# Patient Record
Sex: FEMALE | Race: WHITE
[De-identification: names, ages, dates, MRNs, and addresses within clinical notes are randomized per-mention and may not be internally consistent; named-entity substitution may affect disease eponyms.]

---

## 2017-06-21 ENCOUNTER — HOSPITAL ENCOUNTER (EMERGENCY)
Dept: HOSPITAL 17 - PHED | Age: 27
Discharge: HOME | End: 2017-06-21
Payer: SELF-PAY

## 2017-06-21 VITALS
TEMPERATURE: 98.1 F | RESPIRATION RATE: 16 BRPM | HEART RATE: 92 BPM | SYSTOLIC BLOOD PRESSURE: 159 MMHG | OXYGEN SATURATION: 100 % | DIASTOLIC BLOOD PRESSURE: 109 MMHG

## 2017-06-21 VITALS
DIASTOLIC BLOOD PRESSURE: 67 MMHG | HEART RATE: 92 BPM | SYSTOLIC BLOOD PRESSURE: 113 MMHG | OXYGEN SATURATION: 96 % | RESPIRATION RATE: 18 BRPM

## 2017-06-21 VITALS
SYSTOLIC BLOOD PRESSURE: 140 MMHG | OXYGEN SATURATION: 100 % | DIASTOLIC BLOOD PRESSURE: 78 MMHG | RESPIRATION RATE: 18 BRPM | TEMPERATURE: 98.1 F | HEART RATE: 88 BPM

## 2017-06-21 VITALS — SYSTOLIC BLOOD PRESSURE: 117 MMHG | DIASTOLIC BLOOD PRESSURE: 76 MMHG

## 2017-06-21 VITALS — BODY MASS INDEX: 41.37 KG/M2 | HEIGHT: 63 IN | WEIGHT: 233.47 LBS

## 2017-06-21 VITALS
DIASTOLIC BLOOD PRESSURE: 72 MMHG | HEART RATE: 86 BPM | SYSTOLIC BLOOD PRESSURE: 124 MMHG | RESPIRATION RATE: 18 BRPM | OXYGEN SATURATION: 98 %

## 2017-06-21 DIAGNOSIS — R11.10: ICD-10-CM

## 2017-06-21 DIAGNOSIS — R10.13: Primary | ICD-10-CM

## 2017-06-21 LAB
ALP SERPL-CCNC: 85 U/L (ref 45–117)
ALT SERPL-CCNC: 26 U/L (ref 10–53)
ANION GAP SERPL CALC-SCNC: 8 MEQ/L (ref 5–15)
AST SERPL-CCNC: 15 U/L (ref 15–37)
BACTERIA #/AREA URNS HPF: (no result) /HPF
BASOPHILS # BLD AUTO: 0 TH/MM3 (ref 0–0.2)
BASOPHILS NFR BLD: 0.3 % (ref 0–2)
BILIRUB SERPL-MCNC: 0.3 MG/DL (ref 0.2–1)
BUN SERPL-MCNC: 13 MG/DL (ref 7–18)
CHLORIDE SERPL-SCNC: 108 MEQ/L (ref 98–107)
COLOR UR: YELLOW
COMMENT (UR): (no result)
CULTURE IF INDICATED: (no result)
EOSINOPHIL # BLD: 0.3 TH/MM3 (ref 0–0.4)
EOSINOPHIL NFR BLD: 2.5 % (ref 0–4)
ERYTHROCYTE [DISTWIDTH] IN BLOOD BY AUTOMATED COUNT: 13 % (ref 11.6–17.2)
GFR SERPLBLD BASED ON 1.73 SQ M-ARVRAT: 100 ML/MIN (ref 89–?)
GLUCOSE UR STRIP-MCNC: (no result) MG/DL
HCO3 BLD-SCNC: 26.2 MEQ/L (ref 21–32)
HCT VFR BLD CALC: 38 % (ref 35–46)
HEMO FLAGS: (no result)
HGB UR QL STRIP: (no result)
KETONES UR STRIP-MCNC: (no result) MG/DL
LEUKOCYTE ESTERASE UR QL STRIP: (no result) /HPF (ref 0–5)
LYMPHOCYTES # BLD AUTO: 1.5 TH/MM3 (ref 1–4.8)
LYMPHOCYTES NFR BLD AUTO: 12.8 % (ref 9–44)
MCH RBC QN AUTO: 28.4 PG (ref 27–34)
MCHC RBC AUTO-ENTMCNC: 34.5 % (ref 32–36)
MCV RBC AUTO: 82.2 FL (ref 80–100)
MONOCYTES NFR BLD: 3.4 % (ref 0–8)
NEUTROPHILS # BLD AUTO: 9.6 TH/MM3 (ref 1.8–7.7)
NEUTROPHILS NFR BLD AUTO: 81 % (ref 16–70)
NITRITE UR QL STRIP: (no result)
PLATELET # BLD: 295 TH/MM3 (ref 150–450)
POTASSIUM SERPL-SCNC: 4 MEQ/L (ref 3.5–5.1)
RBC # BLD AUTO: 4.63 MIL/MM3 (ref 4–5.3)
RBC #/AREA URNS HPF: (no result) /HPF (ref 0–3)
SCAN/DIFF: (no result)
SODIUM SERPL-SCNC: 142 MEQ/L (ref 136–145)
SP GR UR STRIP: 1.02 (ref 1–1.03)
SQUAMOUS #/AREA URNS HPF: (no result) /HPF (ref 0–5)
WBC # BLD AUTO: 11.8 TH/MM3 (ref 4–11)

## 2017-06-21 PROCEDURE — 96361 HYDRATE IV INFUSION ADD-ON: CPT

## 2017-06-21 PROCEDURE — 71010: CPT

## 2017-06-21 PROCEDURE — 83690 ASSAY OF LIPASE: CPT

## 2017-06-21 PROCEDURE — 80053 COMPREHEN METABOLIC PANEL: CPT

## 2017-06-21 PROCEDURE — 96374 THER/PROPH/DIAG INJ IV PUSH: CPT

## 2017-06-21 PROCEDURE — 96375 TX/PRO/DX INJ NEW DRUG ADDON: CPT

## 2017-06-21 PROCEDURE — 74176 CT ABD & PELVIS W/O CONTRAST: CPT

## 2017-06-21 PROCEDURE — 93005 ELECTROCARDIOGRAM TRACING: CPT

## 2017-06-21 PROCEDURE — 99285 EMERGENCY DEPT VISIT HI MDM: CPT

## 2017-06-21 PROCEDURE — 85025 COMPLETE CBC W/AUTO DIFF WBC: CPT

## 2017-06-21 PROCEDURE — 84703 CHORIONIC GONADOTROPIN ASSAY: CPT

## 2017-06-21 PROCEDURE — 81001 URINALYSIS AUTO W/SCOPE: CPT

## 2017-06-21 NOTE — RADRPT
EXAM DATE/TIME:  06/21/2017 18:51 

 

[facility] COMPARISON:     

No previous studies available for comparison.

 

                     

INDICATIONS :     

Chest and abdominal pain.

                     

 

MEDICAL HISTORY :     

None.          

 

SURGICAL HISTORY :     

None.   

 

ENCOUNTER:     

Initial                                        

 

ACUITY:     

1 day      

 

PAIN SCORE:     

6/10

 

LOCATION:     

Bilateral lower chest 

 

FINDINGS:     

Portable AP view of the chest demonstrates a normal-sized cardiac silhouette. No effusion, consolidat
ion, or pneumothorax is visualized. The bones and soft tissues demonstrate no acute abnormality.

 

CONCLUSION:     

No acute cardiopulmonary abnormality is identified.

 

 

 

 Donaldo Ngo MD on June 21, 2017 at 19:14           

Board Certified Radiologist.

 This report was verified electronically.

## 2017-06-21 NOTE — RADRPT
EXAM DATE/TIME:  06/21/2017 19:46 

 

[facility] COMPARISON:     

No previous studies available for comparison.

 

 

INDICATIONS :     

Left flank pain.

                  

 

ORAL CONTRAST:      

No oral contrast ingested.

                  

 

RADIATION DOSE:     

27.88 CTDIvol (mGy) 

 

 

MEDICAL HISTORY :     

None  

 

SURGICAL HISTORY :      

None. 

 

ENCOUNTER:      

Initial

 

ACUITY:      

1 day

 

PAIN SCALE:      

8/10

 

LOCATION:        

flank 

 

TECHNIQUE:     

Volumetric scanning of the abdomen and pelvis was performed.  Using automated exposure control and ad
justment of the mA and/or kV according to patient size, radiation dose was kept as low as reasonably 
achievable to obtain optimal diagnostic quality images. 

 

FINDINGS:     

 

LOWER LUNGS:     

The visualized lower lungs are clear.

 

LIVER:     

Liver is isodense to the spleen. No focal lesion is seen.  There is no dilation of the biliary tree. 
 No calcified gallstones.

 

SPLEEN:     

Normal size without lesion.

 

PANCREAS:     

Within normal limits. 

 

KIDNEYS:     

Left kidney is slightly smaller than right with areas of parenchymal scar. There is no hydronephrosis
, stone, or mass.

 

ADRENAL GLANDS:     

Within normal limits.

 

VASCULAR:     

There is no aortic aneurysm.

 

BOWEL/MESENTERY:     

The stomach, small bowel, and colon demonstrate no acute abnormality.  There is no free intraperitone
al air. There is trace free fluid in the pelvis in the posterior cul-de-sac. 

 

ABDOMINAL WALL:     

Within normal limits.

 

RETROPERITONEUM:     

There is no lymphadenopathy.

 

BLADDER:     

No wall thickening or mass.

 

REPRODUCTIVE:     

Within normal limits.

 

INGUINAL:     

There is no lymphadenopathy or hernia.

 

MUSCULOSKELETAL:     

No acute abnormality.

 

CONCLUSION:     

1. No abnormality is identified to explain the patient's flank pain. No renal stones are present.

2. Trace free fluid in the pelvis is nonspecific but may be physiologic.

3. Mild hepatic steatosis.

 

 

 

 Donaldo Ngo MD on June 21, 2017 at 20:04           

Board Certified Radiologist.

 This report was verified electronically.

## 2017-06-21 NOTE — PD
Physical Exam


Date Seen by Provider:  Jun 21, 2017


Time Seen by Provider:  19:30


Narrative


accepted in transfer of care from Dr Barrientos


GENERAL: Well-developed well-nourished female in no acute distress no 

respiratory distress


SKIN: Warm and dry.


HEAD: Normocephalic.


EYES: No scleral icterus. No injection or drainage. 


NECK: Supple, trachea midline. No JVD or lymphadenopathy.


CARDIOVASCULAR: Regular rate and rhythm without murmurs, gallops, or rubs. 


RESPIRATORY: Breath sounds equal bilaterally. No accessory muscle use.


GASTROINTESTINAL: Abdomen soft, non-tender, nondistended. 


MUSCULOSKELETAL: No cyanosis, or edema. 


BACK: Nontender without obvious deformity. No CVA tenderness.








Data


Data


Last Documented VS





Vital Signs








  Date Time  Temp Pulse Resp B/P Pulse Ox O2 Delivery O2 Flow Rate FiO2


 


6/21/17 19:49   18     


 


6/21/17 19:05     100 Room Air  


 


6/21/17 19:05 98.1 88  140/78    








Orders





 Complete Blood Count With Diff (6/21/17 18:39)


Comprehensive Metabolic Panel (6/21/17 18:39)


Lipase (6/21/17 18:39)


Urinalysis - C+S If Indicated (6/21/17 18:39)


Iv Access Insert/Monitor (6/21/17 18:39)


Ecg Monitoring (6/21/17 18:39)


Oximetry (6/21/17 18:39)


Morphine Inj (Morphine Inj) (6/21/17 18:45)


Ondansetron Inj (Zofran Inj) (6/21/17 18:45)


Sodium Chlor 0.9% 1000 Ml Inj (Ns 1000 M (6/21/17 18:39)


Sodium Chloride 0.9% Flush (Ns Flush) (6/21/17 18:45)


Electrocardiogram (6/21/17 18:39)


Ketorolac Inj (Toradol Inj) (6/21/17 18:45)


Ed Urine Pregnancytest Poc (6/21/17 18:39)


Chest, Single Ap (6/21/17 )


Ct Abd/Pel W/O Iv Contrast (6/21/17 )





Labs





 Laboratory Tests








Test 6/21/17 6/21/17





 18:45 19:05


 


White Blood Count 11.8 TH/MM3 


 


Red Blood Count 4.63 MIL/MM3 


 


Hemoglobin 13.1 GM/DL 


 


Hematocrit 38.0 % 


 


Mean Corpuscular Volume 82.2 FL 


 


Mean Corpuscular Hemoglobin 28.4 PG 


 


Mean Corpuscular Hemoglobin 34.5 % 





Concent  


 


Red Cell Distribution Width 13.0 % 


 


Platelet Count 295 TH/MM3 


 


Mean Platelet Volume 9.5 FL 


 


Neutrophils (%) (Auto) 81.0 % 


 


Lymphocytes (%) (Auto) 12.8 % 


 


Monocytes (%) (Auto) 3.4 % 


 


Eosinophils (%) (Auto) 2.5 % 


 


Basophils (%) (Auto) 0.3 % 


 


Neutrophils # (Auto) 9.6 TH/MM3 


 


Lymphocytes # (Auto) 1.5 TH/MM3 


 


Monocytes # (Auto) 0.4 TH/MM3 


 


Eosinophils # (Auto) 0.3 TH/MM3 


 


Basophils # (Auto) 0.0 TH/MM3 


 


CBC Comment AUTO DIFF  


 


Differential Comment AUTO DIFF 





 CONFIRMED 


 


Sodium Level 142 MEQ/L 


 


Potassium Level 4.0 MEQ/L 


 


Chloride Level 108 MEQ/L 


 


Carbon Dioxide Level 26.2 MEQ/L 


 


Anion Gap 8 MEQ/L 


 


Blood Urea Nitrogen 13 MG/DL 


 


Creatinine 0.71 MG/DL 


 


Estimat Glomerular Filtration 100 ML/MIN 





Rate  


 


Random Glucose 93 MG/DL 


 


Calcium Level 8.8 MG/DL 


 


Total Bilirubin 0.3 MG/DL 


 


Aspartate Amino Transf 15 U/L 





(AST/SGOT)  


 


Alanine Aminotransferase 26 U/L 





(ALT/SGPT)  


 


Alkaline Phosphatase 85 U/L 


 


Total Protein 7.4 GM/DL 


 


Albumin 3.5 GM/DL 


 


Lipase 171 U/L 


 


Urine Color  YELLOW 


 


Urine Turbidity  CLEAR 


 


Urine pH  6.0 


 


Urine Specific Gravity  1.023 


 


Urine Protein  NEG mg/dL


 


Urine Glucose (UA)  NEG mg/dL


 


Urine Ketones  NEG mg/dL


 


Urine Occult Blood  TRACE 


 


Urine Nitrite  NEG 


 


Urine Bilirubin  NEG 


 


Urine Leukocyte Esterase  NEG 


 


Urine RBC  0-3 /hpf


 


Urine WBC  0-2 /hpf


 


Urine Squamous Epithelial  6-8 /hpf





Cells  


 


Urine Bacteria  RARE /hpf


 


Microscopic Urinalysis Comment  CULT NOT





  INDICATED











[facility]


Medical Record Reviewed:  Yes


Supervised Visit with NAVEED:  No


Interpretation(s)


POC hcg: negative


UA: trace blood; cx not indicated


Last Impressions








Chest X-Ray 6/21/17 0000 Signed





Impressions: 





 Service Date/Time:  Wednesday, June 21, 2017 18:51 - CONCLUSION:  No acute 





 cardiopulmonary abnormality is identified.     Donaldo Ngo MD 


 


Abdomen/Pelvis CT 6/21/17 0000 Signed





Impressions: 





 Service Date/Time:  Wednesday, June 21, 2017 19:46 - CONCLUSION:  1. No 





 abnormality is identified to explain the patient's flank pain. No renal stones 





 are present. 2. Trace free fluid in the pelvis is nonspecific but may be 





 physiologic. 3. Mild hepatic steatosis.     Donaldo Ngo MD 





CBC & BMP Diagram


6/21/17 18:45











 Vital Signs








  Date Time  Temp Pulse Resp B/P Pulse Ox O2 Delivery O2 Flow Rate FiO2


 


6/21/17 19:49   18     


 


6/21/17 19:49   18     


 


6/21/17 19:05     100 Room Air  


 


6/21/17 19:05 98.1 88 18 140/78 100 Room Air  


 


6/21/17 18:03 98.1 92 16 159/109 100   








Differential Diagnosis


accepted in transfer of care from Dr Barrientos


Narrative Course


accepted in transfer of care from Dr Barrientos; follow up pending labs


Labs wnl; PERC negative; Ct Renal stone protocol ordered


CT neg acute findings; pain 0-1/10 -- patient stable for outpatient management; 

patient desirous of being discharged home will be given prescription for Zofran 

to take as needed encouraged to follow clear liquid diet for next 1224 hrs. 

advance as tolerated bland/Abdon diet then regular diet avoid fried and fatty 

foods.  Patient encouraged follow-up with primary care provider.  Patient 

cursed return to emergency for free concerns or change in condition


Diagnosis





 Primary Impression:  


 Abdominal pain


 Qualified Code:  R10.13 - Epigastric pain


Referrals:  


Primary Care Physician


Patient Instructions:  General Instructions


Departure Forms:  Tests/Procedures, Work Release   


   Special Instructions:  no work x 1 day





***Additional Instruction:


Increase fluid hydration


May use ibuprofen/Advil/Motrin per package directions as often as every 6-8 

hours for pain associated with inflammation


May use Zofran as prescribed as needed for nausea and/or vomiting


Follow clear liquid diet for next 12-24 hours advance as tolerated bland/Abdon 

diet avoid fried and fatty foods with regular dietary intake


***Med/Other Pt SpecificInfo:  Prescription(s) given


Scripts


Ondansetron Odt (Zofran Odt)4 Mg Tab4 Mg SL Q6HR PRN (Nausea/Vomiting) #10 TAB  

Ref 0


   Prov:Nell Warren MD         6/21/17


Disposition:  01 DISCHARGE HOME


Condition:  Stable








Nell Warren MD Jun 21, 2017 20:43

## 2017-06-21 NOTE — PD
HPI


Chief Complaint:  GI Complaint


Time Seen by Provider:  18:33


Travel History


International Travel<30 days:  No


Contact w/Intl Traveler<30days:  No


Traveled to known affect area:  No





History of Present Illness


HPI


The patient is a 26-year-old  female who presents to the emergency 

department for left flank pain.  The patient states she was driving home at 

approximate 4:45 PM when she developed sharp left-sided flank pain.  The pain 

was located in the lower aspect of her ribs and radiated from the left flank to 

the epigastrium.  The pain was sharp, stabbing, associated with nausea.  The 

patient arrived him and subsequently had 2 episodes of vomiting.  She denied 

any shortness of breath, upper chest pain, or pleuritic chest pain.  She denied 

any radiation of the pain down into the groin.  She denied any dysuria, 

frequency, urgency, or hematuria.  The pain at 4:45 PM was 10/10.  Upon arrival 

the patient's pain was 6/10, is now improved to 4/10.  She denies any history 

of known gastritis, pancreatitis, biliary colic.  She denies any previous 

abdominal surgeries.  The patient's last menstrual cycle was June 1, 2017, she 

denies pregnancy.  Her  is been out of town 9 weeks.  Symptoms are 

moderate, slightly self alleviating after vomiting, and there are no known 

exacerbating factors.  The patient denies any associated history of pulmonary 

embolism, DVT, recent travel, recent surgeries, or recent hospitalizations.





PFSH


Past Medical History


Cancer:  No


Diabetes:  No


Glaucoma:  No


Hepatitis:  No


Hiatal Hernia:  No


Hypertension:  No


Immunizations Current:  Yes


Thyroid Disease:  No


Pregnant?:  Not Pregnant


LMP:  6/1/17





Past Surgical History


Arteriovenous Shunt:  Yes


Pacemaker:  No


Tonsillectomy:  Yes


Other Surgery:  Yes (RHINOPLASTY)





Social History


Alcohol Use:  No


Tobacco Use:  No


Substance Use:  No





Allergies-Medications


(Allergen,Severity, Reaction):  


Coded Allergies:  


     No Known Allergies (Verified , 6/21/17)


Reported Meds & Prescriptions





Reported Meds & Active Scripts


Active


No Active Prescriptions or Reported Medications    








Review of Systems


Except as stated in HPI:  all other systems reviewed are Neg


General / Constitutional:  No: Fever


Cardiovascular:  No: Chest Pain or Discomfort


Respiratory:  No: Shortness of Breath


Gastrointestinal:  Positive: Nausea, Vomiting, Abdominal Pain,  No: Diarrhea


Genitourinary:  Positive: Flank Pain,  No: Urgency, Frequency, Dysuria, 

Hematuria, Discharge


Skin:  No Rash





Physical Exam


Narrative


GENERAL: Awake, alert, nontoxic-appearing 26-year-old female who appears her 

stated age and is in no acute respiratory distress.


SKIN: Focused skin assessment warm/dry.


HEAD: Atraumatic. Normocephalic. 


EYES: Pupils equal and round. No scleral icterus. No injection or drainage. 


ENT: No nasal bleeding or discharge.  Mucous membranes pink and moist.


NECK: Trachea midline. No JVD. 


CARDIOVASCULAR: Regular rate and rhythm.  No murmur appreciated.  Heart rate in 

the 80s.


RESPIRATORY: No accessory muscle use. Clear to auscultation. Breath sounds 

equal bilaterally. 


GASTROINTESTINAL: Abdomen soft, obese, no rebound tenderness.


Back: No CVA tenderness.


MUSCULOSKELETAL: No obvious deformities. No clubbing.  No cyanosis.  No edema.  

Calves are soft bilaterally.


NEUROLOGICAL: Awake and alert. No obvious cranial nerve deficits.  Motor 

grossly within normal limits. Normal speech.


PSYCHIATRIC: Appropriate mood and affect; insight and judgment normal.





Data


Data


Last Documented VS





Vital Signs








  Date Time  Temp Pulse Resp B/P Pulse Ox O2 Delivery O2 Flow Rate FiO2


 


6/21/17 18:03 98.1 92 16 159/109 100   








Orders





 Complete Blood Count With Diff (6/21/17 18:39)


Comprehensive Metabolic Panel (6/21/17 18:39)


Lipase (6/21/17 18:39)


Urinalysis - C+S If Indicated (6/21/17 18:39)


Iv Access Insert/Monitor (6/21/17 18:39)


Ecg Monitoring (6/21/17 18:39)


Oximetry (6/21/17 18:39)


Morphine Inj (Morphine Inj) (6/21/17 18:45)


Ondansetron Inj (Zofran Inj) (6/21/17 18:45)


Sodium Chlor 0.9% 1000 Ml Inj (Ns 1000 M (6/21/17 18:39)


Sodium Chloride 0.9% Flush (Ns Flush) (6/21/17 18:45)


Electrocardiogram (6/21/17 18:39)


Ketorolac Inj (Toradol Inj) (6/21/17 18:45)


Ed Urine Pregnancytest Poc (6/21/17 18:39)


Chest, Single Ap (6/21/17 )








[facility]


Medical Decision Making


Medical Screen Exam Complete:  Yes


Emergency Medical Condition:  Yes


Medical Record Reviewed:  Yes


Interpretation(s)


EKG reveals normal sinus rhythm with a rate 85.  No ischemic changes or ectopy 

noted.


Differential Diagnosis


Differential diagnosis includes nephrolithiasis, pyelonephritis, pulmonary 

embolism, pancreatitis, bowel gas pain, pericarditis, ectopic pregnancy.


Narrative Course


IV was established, labs are drawn and sent, and the patient was placed on 

cardiac telemetry monitoring and continuous pulse oximetry monitoring.  EKG was 

ordered and interpreted.  The patient was administered morphine, Toradol, Zofran

, and IV fluids.  Chest x-ray was obtained.  The patient was signed out at 7 PM 

laboratory evaluation pending.


Scripts


No Active Prescriptions or Reported Meds


Condition:  Stable








Chung Barrientos MD Jun 21, 2017 18:43

## 2017-06-22 NOTE — EKG
Date Performed: 06/21/2017       Time Performed: 19:01:08

 

PTAGE:      26 years

 

EKG:      Sinus rhythm 

 

 NORMAL ECG 

 

NO PREVIOUS TRACING            

 

DOCTOR:   Asia Catalan  Interpretating Date/Time  06/22/2017 13:42:21

## 2018-03-19 ENCOUNTER — HOSPITAL ENCOUNTER (EMERGENCY)
Dept: HOSPITAL 17 - PHED | Age: 28
Discharge: HOME | End: 2018-03-19
Payer: COMMERCIAL

## 2018-03-19 VITALS
HEART RATE: 91 BPM | SYSTOLIC BLOOD PRESSURE: 145 MMHG | RESPIRATION RATE: 18 BRPM | DIASTOLIC BLOOD PRESSURE: 95 MMHG | TEMPERATURE: 99.3 F | OXYGEN SATURATION: 98 %

## 2018-03-19 VITALS — BODY MASS INDEX: 42.97 KG/M2 | WEIGHT: 242.51 LBS | HEIGHT: 63 IN
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
## 2018-03-19 DIAGNOSIS — S93.401A: ICD-10-CM

## 2018-03-19 DIAGNOSIS — S93.402A: Primary | ICD-10-CM

## 2018-03-19 PROCEDURE — 73630 X-RAY EXAM OF FOOT: CPT

## 2018-03-19 PROCEDURE — 99283 EMERGENCY DEPT VISIT LOW MDM: CPT

## 2018-03-19 PROCEDURE — 73610 X-RAY EXAM OF ANKLE: CPT

## 2018-03-19 NOTE — RADRPT
EXAM DATE/TIME:  03/19/2018 10:18 

 

[facility] COMPARISON:     

No previous studies available for comparison.

 

                     

INDICATIONS :     

Trip and fall, left lateral ankle pain.

                     

 

MEDICAL HISTORY :     

None.          

 

SURGICAL HISTORY :     

None.   

 

ENCOUNTER:     

Initial                                        

 

ACUITY:     

4 - 6 days      

 

PAIN SCORE:     

4/10

 

LOCATION:     

Left lateral ankle

 

FINDINGS:     

Three view exam was performed of the left ankle.  The bony structures are in normal alignment.  No ev
idence of fracture, dislocation, or soft tissue swelling.  The ankle mortise is intact.  No radiopaqu
e foreign bodies are seen.  Bony mineralization is normal.

 

CONCLUSION:     Negative for fracture or dislocation. Follow up in 7-10 days is suggested if symptoms
 persist.

 

 

 Bharathi Sena MD FACR on March 19, 2018 at 11:11           

Board Certified Radiologist.

 This report was verified electronically.

## 2018-03-19 NOTE — PD
HPI


Chief Complaint:  Musculoskeletal Complaint


Time Seen by Provider:  09:39


Travel History


International Travel<30 days:  No


Contact w/Intl Traveler<30days:  No


Traveled to known affect area:  No





History of Present Illness


HPI


This 27-year-old female is complaining of pain in both of her ankles.  She had 

a fall Thursday night.  sHe initially twisted her right foot and ankle and 

ended up also twisting her left ankle in the ensuing fall.  She had gone to 

[facility] and was put in a stirrup on the left ankle and crutches.  She is 

not able to bear weight on her right foot.





PFSH


Past Medical History


Cancer:  No


Diabetes:  No


Glaucoma:  No


Hepatitis:  No


Hiatal Hernia:  No


Hypertension:  No


Immunizations Current:  Yes


Thyroid Disease:  No


Pregnant?:  Not Pregnant


LMP:  3/02/18





Past Surgical History


Arteriovenous Shunt:  Yes


Pacemaker:  No


Tonsillectomy:  Yes


Other Surgery:  Yes (RHINOPLASTY)





Social History


Alcohol Use:  No


Tobacco Use:  No


Substance Use:  No





Allergies-Medications


(Allergen,Severity, Reaction):  


Coded Allergies:  


     No Known Allergies (Verified  Adverse Reaction, Unknown, 3/19/18)


Reported Meds & Prescriptions





Reported Meds & Active Scripts


Active


No Active Prescriptions or Reported Medications    








Review of Systems


General / Constitutional:  No: Fever, Chills


Eyes:  No: Diploplia, Blurred Vision


HENT:  No: Headaches, Vertigo


Cardiovascular:  No: Chest Pain or Discomfort, Palpitations


Respiratory:  No: Cough, Shortness of Breath


Gastrointestinal:  No: Vomiting, Diarrhea


Musculoskeletal:  Positive: Edema, Pain, No: Myalgias


Skin:  No Rash, No Itching


Hematologic/Lymphatic:  No: Easy Bruising





Physical Exam


Narrative


GENERAL: Well-developed female


SKIN: Focused skin assessment warm/dry.


HEAD: Atraumatic. Normocephalic. 


EYES: Pupils equal and round. No scleral icterus. No injection or drainage. 


ENT: No nasal bleeding or discharge.  Mucous membranes pink and moist.


NECK: Trachea midline. No JVD. 


CARDIOVASCULAR: Regular rate and rhythm.  No murmur appreciated.


RESPIRATORY: No accessory muscle use. Clear to auscultation. Breath sounds 

equal bilaterally. 


GASTROINTESTINAL: Abdomen soft, non-tender, nondistended. Hepatic and splenic 

margins not palpable. 


MUSCULOSKELETAL: No obvious deformities. No clubbing.  There is swelling and 

tenderness of the right foot and ankle.  There is tenderness at the base of the 

fifth metatarsal and also the lateral portion of the ankle.  There is also 

swelling and tenderness of the left ankle


NEUROLOGICAL: Awake and alert. No obvious cranial nerve deficits.  Motor 

grossly within normal limits. Normal speech.


PSYCHIATRIC: Appropriate mood and affect; insight and judgment normal.





Data


Data


Last Documented VS





Vital Signs








  Date Time  Temp Pulse Resp B/P (MAP) Pulse Ox O2 Delivery O2 Flow Rate FiO2


 


3/19/18 09:21 99.3 91 18 145/95 (112) 98   








Orders





 Orders


Ankle, Complete (Buy5bra) (3/19/18 09:50)


Foot, Complete ([id number]) (3/19/18 09:50)


Ankle, Complete (Bbu5xux) (3/19/18 09:50)








[facility]


Medical Decision Making


Medical Screen Exam Complete:  Yes


Emergency Medical Condition:  Yes


Medical Record Reviewed:  Yes


Differential Diagnosis


Differential includes fractured ankle, fracture fifth metatarsal, sprain


Narrative Course


X-rays are negative for fracture.  Patient will be released with 

recommendations to keep the foot elevated.  She will be off work for 2 days





Diagnosis





 Primary Impression:  


 Left ankle sprain


 Additional Impression:  


 Right ankle sprain


Departure Forms:  Tests/Procedures, Work Release   Enter return to work date:  

Mar 22, 2018





***Additional Instructions:  


Keep feet elevated, avoid weightbearing


Scripts


No Active Prescriptions or Reported Meds


Disposition:  01 DISCHARGE HOME


Condition:  Stable











Joshua Reis MD Mar 19, 2018 09:56

## 2018-03-19 NOTE — RADRPT
EXAM DATE/TIME:  03/19/2018 10:18 

 

[facility] COMPARISON:     

No previous studies available for comparison.

 

                     

INDICATIONS :     

Trip and fall, right lateral ankle pain.

                     

 

MEDICAL HISTORY :     

None.          

 

SURGICAL HISTORY :     

None.   

 

ENCOUNTER:     

Initial                                        

 

ACUITY:     

4 - 6 days      

 

PAIN SCORE:     

6/10

 

LOCATION:     

Right lateral ankle

 

FINDINGS:     

Three view exam was performed of the right ankle.  The bony structures are in normal alignment minima
l soft tissue swelling lateral side without fracture.  The ankle mortise is intact.  No radiopaque fo
reign bodies are seen.  Bony mineralization is normal.

 

CONCLUSION:     Negative for fracture or dislocation. Follow up in 7-10 days is suggested if symptoms
 persist.

 

 

 Bharathi Sena MD FACR on March 19, 2018 at 11:09           

Board Certified Radiologist.

 This report was verified electronically.

## 2018-03-19 NOTE — RADRPT
EXAM DATE/TIME:  03/19/2018 10:18 

 

[facility] COMPARISON:     

No previous studies available for comparison.

 

                     

INDICATIONS :     

Trip and fall, right lateral foot pain.

                     

 

MEDICAL HISTORY :     

None.          

 

SURGICAL HISTORY :     

None.   

 

ENCOUNTER:     

Initial                                        

 

ACUITY:     

4 - 6 days      

 

PAIN SCORE:     

6/10

 

LOCATION:     

Right lateral foot

 

FINDINGS:     

Three view examination of the right foot demonstrates no dislocation, or fracture.  Minimal soft tiss
ue swelling is evident. The tarsal bones appear intact.  The interphalangeal and metatarsophalangeal 
joints are intact.  The calcaneus is intact.  Bony mineralization is normal.

 

CONCLUSION:     

Negative for fracture or dislocation. Follow up in 7-10 days is suggested if symptoms persist.

 

 

 

 Bharathi Sena MD FACR on March 19, 2018 at 11:10           

Board Certified Radiologist.

 This report was verified electronically.